# Patient Record
Sex: FEMALE | Race: WHITE
[De-identification: names, ages, dates, MRNs, and addresses within clinical notes are randomized per-mention and may not be internally consistent; named-entity substitution may affect disease eponyms.]

---

## 2017-01-12 ENCOUNTER — HOSPITAL ENCOUNTER (EMERGENCY)
Dept: HOSPITAL 25 - ED | Age: 42
Discharge: HOME | End: 2017-01-12
Payer: COMMERCIAL

## 2017-01-12 VITALS — DIASTOLIC BLOOD PRESSURE: 69 MMHG | SYSTOLIC BLOOD PRESSURE: 134 MMHG

## 2017-01-12 DIAGNOSIS — M79.642: Primary | ICD-10-CM

## 2017-01-12 PROCEDURE — 99282 EMERGENCY DEPT VISIT SF MDM: CPT

## 2017-01-12 NOTE — RAD
Indication: Left hand pain and injury.



4 views of left hand demonstrates no fracture. No other bone or joint abnormality is

noted.



IMPRESSION: No fracture of the left hand is noted.

## 2017-01-12 NOTE — ED
Upper Extremity Pain





- HPI Summary


HPI Summary: 


41 F presents with left hand pain s/p FOOSH.  She was going up the stairs in 

the dark when she tripped and fell on her left hand. She states the area felt 

numb right after but that has resolved.  She admit to pain over the palmar 

aspect of her hand.  She is right handed. 








- History of Current Complaint


Chief Complaint: EDExtremityUpper


Stated Complaint: POSSIBLE FRACTURE LT HAND


Time Seen by Provider: 01/12/17 20:14





- Allergies/Home Medications


Allergies/Adverse Reactions: 


 Allergies











Allergy/AdvReac Type Severity Reaction Status Date / Time


 


Penicillins Allergy Intermediate Hives Verified 01/28/15 09:37


 


Levothyroxine Allergy Mild Hives Verified 01/28/15 09:37





[From Synthroid]     


 


Soy Allergy Allergy Mild Rash Verified 01/28/15 09:37


 


Ciprofloxacin [From Cipro] Allergy  Hives Verified 07/11/15 21:16


 


DAIRY Allergy Mild Unknown Uncoded 01/28/15 09:37





   Reaction  





   Details  














PMH/Surg Hx/FS Hx/Imm Hx


Endocrine/Hematology History: 


   Denies: Hx Diabetes


Cardiovascular History: 


   Denies: Hx Angina


Infectious Disease History: No


Infectious Disease History: 


   Denies: Traveled Outside the [place] in Last 30 Days





- Family History


Known Family History: Positive: Hypertension





- Social History


Alcohol Use: None


Substance Use Type: Reports: None


Smoking Status (MU): Former Smoker





Review of Systems


Negative: Fever


Negative: Chest Pain


Negative: Shortness Of Breath


Positive: Myalgia - left hand pain


All Other Systems Reviewed And Are Negative: Yes





Physical Exam


Triage Information Reviewed: Yes


Vital Signs On Initial Exam: 


 Initial Vitals











Temp Pulse Resp BP Pulse Ox


 


 100 F   103   16   134/69   100 


 


 01/12/17 20:07  01/12/17 20:07  01/12/17 20:07  01/12/17 20:07  01/12/17 20:07











Vital Signs Reviewed: Yes


Appearance: Positive: Well-Appearing


Skin: Positive: Warm, Dry


Head/Face: Positive: Normal Head/Face Inspection


Eyes: Positive: Normal, Conjunctiva Clear


ENT: Positive: Normal ENT inspection, Pharynx normal, TMs normal


Respiratory/Lung Sounds: Positive: Clear to Auscultation, Breath Sounds Present


Cardiovascular: Positive: Normal, RRR


Musculoskeletal: Positive: Strength/ROM Intact - of left hand, Other - no snuff 

box tenderness, tender over palmar aspect of carpels, no step off noted, 

capillary refill <2secs, good pulses





Diagnostics





- Vital Signs


 Vital Signs











  Temp Pulse Resp BP Pulse Ox


 


 01/12/17 20:07  100 F  103  16  134/69  100














- Laboratory


Lab Statement: Any lab studies that have been ordered have been reviewed, and 

results considered in the medical decision making process.





- Radiology


  ** hand


Xray Interpretation: No Acute Changes


Radiology Interpretation Completed By: Radiologist





Course/Dx





- Course


Course Of Treatment: 41 F presents with left hand pain s/p FOOSH, admits to 

pain over palmar aspect of carpel bones, xray was normal, will treat 

conservatively patient agrees with plan





- Diagnoses


Differential Diagnosis/HQI/PQRI: Positive: Contusion, Fracture (Open), Strain, 

Sprain


Provider Diagnoses: 


 Left hand pain








Discharge





- Discharge Plan


Condition: Good


Disposition: HOME


Patient Education Materials:  Hand Sprain (ED)


Referrals: 


[facility] [place] [facility] [Outside]


Additional Instructions: 


Take Tylenol or ibuprofen every 6 hours as needed for pain


Apply ice, rest, elevate


Follow up with primary care physician within 5 days


Return to ED if develop numbness, tingling, inability to move joint, or any new 

or worsening symptoms

## 2017-05-07 ENCOUNTER — HOSPITAL ENCOUNTER (EMERGENCY)
Dept: HOSPITAL 25 - ED | Age: 42
Discharge: HOME | End: 2017-05-07
Payer: COMMERCIAL

## 2017-05-07 VITALS — DIASTOLIC BLOOD PRESSURE: 77 MMHG | SYSTOLIC BLOOD PRESSURE: 107 MMHG

## 2017-05-07 DIAGNOSIS — H91.8X1: ICD-10-CM

## 2017-05-07 DIAGNOSIS — R05: ICD-10-CM

## 2017-05-07 DIAGNOSIS — Z87.891: ICD-10-CM

## 2017-05-07 DIAGNOSIS — R53.1: ICD-10-CM

## 2017-05-07 DIAGNOSIS — R20.0: Primary | ICD-10-CM

## 2017-05-07 LAB
ALBUMIN SERPL BCG-MCNC: 4.8 G/DL (ref 3.2–5.2)
ALP SERPL-CCNC: 43 U/L (ref 34–104)
ALT SERPL W P-5'-P-CCNC: 14 U/L (ref 7–52)
ANION GAP SERPL CALC-SCNC: 6 MMOL/L (ref 2–11)
AST SERPL-CCNC: 14 U/L (ref 13–39)
BUN SERPL-MCNC: 9 MG/DL (ref 6–24)
BUN/CREAT SERPL: 11.8 (ref 8–20)
CALCIUM SERPL-MCNC: 10 MG/DL (ref 8.6–10.3)
CHLORIDE SERPL-SCNC: 101 MMOL/L (ref 101–111)
CK SERPL-CCNC: 76 U/L (ref 10–223)
GLOBULIN SER CALC-MCNC: 4.1 G/DL (ref 2–4)
GLUCOSE SERPL-MCNC: 96 MG/DL (ref 70–100)
HCO3 SERPL-SCNC: 26 MMOL/L (ref 22–32)
HCT VFR BLD AUTO: 44 % (ref 35–47)
HGB BLD-MCNC: 14.4 G/DL (ref 12–16)
LIPASE SERPL-CCNC: 24 U/L (ref 11–82)
MAGNESIUM SERPL-MCNC: 2.4 MG/DL (ref 1.9–2.7)
MCH RBC QN AUTO: 29 PG (ref 27–31)
MCHC RBC AUTO-ENTMCNC: 33 G/DL (ref 31–36)
MCV RBC AUTO: 89 FL (ref 80–97)
POTASSIUM SERPL-SCNC: 3.6 MMOL/L (ref 3.5–5)
PROT SERPL-MCNC: 8.9 G/DL (ref 6.4–8.9)
RBC # BLD AUTO: 4.92 10^6/UL (ref 4–5.4)
SODIUM SERPL-SCNC: 133 MMOL/L (ref 133–145)
TROPONIN I SERPL-MCNC: 0 NG/ML (ref ?–0.04)
TSH SERPL-ACNC: 5.09 MCIU/ML (ref 0.34–5.6)
WBC # BLD AUTO: 8.5 10^3/UL (ref 3.5–10.8)

## 2017-05-07 PROCEDURE — 85730 THROMBOPLASTIN TIME PARTIAL: CPT

## 2017-05-07 PROCEDURE — 86140 C-REACTIVE PROTEIN: CPT

## 2017-05-07 PROCEDURE — 84443 ASSAY THYROID STIM HORMONE: CPT

## 2017-05-07 PROCEDURE — 84702 CHORIONIC GONADOTROPIN TEST: CPT

## 2017-05-07 PROCEDURE — 80053 COMPREHEN METABOLIC PANEL: CPT

## 2017-05-07 PROCEDURE — 99282 EMERGENCY DEPT VISIT SF MDM: CPT

## 2017-05-07 PROCEDURE — 36415 COLL VENOUS BLD VENIPUNCTURE: CPT

## 2017-05-07 PROCEDURE — 82550 ASSAY OF CK (CPK): CPT

## 2017-05-07 PROCEDURE — 82553 CREATINE MB FRACTION: CPT

## 2017-05-07 PROCEDURE — 85379 FIBRIN DEGRADATION QUANT: CPT

## 2017-05-07 PROCEDURE — 93005 ELECTROCARDIOGRAM TRACING: CPT

## 2017-05-07 PROCEDURE — 82306 VITAMIN D 25 HYDROXY: CPT

## 2017-05-07 PROCEDURE — 85025 COMPLETE CBC W/AUTO DIFF WBC: CPT

## 2017-05-07 PROCEDURE — 84481 FREE ASSAY (FT-3): CPT

## 2017-05-07 PROCEDURE — 83605 ASSAY OF LACTIC ACID: CPT

## 2017-05-07 PROCEDURE — 85610 PROTHROMBIN TIME: CPT

## 2017-05-07 PROCEDURE — 80061 LIPID PANEL: CPT

## 2017-05-07 PROCEDURE — 83690 ASSAY OF LIPASE: CPT

## 2017-05-07 PROCEDURE — 84484 ASSAY OF TROPONIN QUANT: CPT

## 2017-05-07 PROCEDURE — 84439 ASSAY OF FREE THYROXINE: CPT

## 2017-05-07 PROCEDURE — 83735 ASSAY OF MAGNESIUM: CPT

## 2017-05-07 NOTE — ED
Jj CARROLL Billy, scribed for Jorge A aRjput MD on 05/07/17 at 1857 .





Complex/Multi-Sys Presentation





- HPI Summary


HPI Summary: 


Patient is a 42 year-old female with a history of Hashimoto's coming to [facility] 

for evaluation of multiple complaints. She states that she has had URI and 

muffled hearing out of her right ear for the last 3 weeks. However, she denies 

earache, and there has been no trauma or injury to the ear. She also has had 

paresthesia in her fingertips for the last week, which has happened before. 

Patient feels that her hands are getting weaker, worse on the right side. 

Furthermore, she has dysphagia when eating, feeling like "something gets stuck 

behind her throat." She also states that one month ago, she had an episode 

where her heart felt like "it was being squeezed." However, she denies any 

chest pain today.





- History Of Current Complaint


Chief Complaint: EDGeneral


Time Seen by Provider: 05/07/17 18:41


Hx Obtained From: Patient


Onset/Duration: Gradual Onset, Lasting Weeks


Timing: Constant


Severity Currently: Moderate


Severity Initially: Moderate


Aggravating Factor(s): none


Alleviating Factor(s): none


Associated Signs And Symptoms: Positive: Cough, Other - paresthesia, dysphagia, 

muffled hearing,.  Negative: Chest Pain





- Allergies/Home Medications


Allergies/Adverse Reactions: 


 Allergies











Allergy/AdvReac Type Severity Reaction Status Date / Time


 


Penicillins Allergy Intermediate Hives Verified 05/07/17 18:33


 


Levothyroxine Allergy Mild Hives Verified 05/07/17 18:33





[From Synthroid]     


 


Soy Allergy Allergy Mild Rash Verified 05/07/17 18:33


 


Ciprofloxacin [From Cipro] Allergy  Hives Verified 05/07/17 18:33


 


DAIRY Allergy Mild Unknown Uncoded 05/07/17 18:33





   Reaction  





   Details  














PMH/Surg Hx/FS Hx/Imm Hx


Endocrine/Hematology History: Reports: Other Endocrine/Hematological Disorders 

- Hashimoto's Disease


   Denies: Hx Diabetes


Cardiovascular History: 


   Denies: Hx Angina


Neurological History: Reports: Other Neuro Impairments/Disorders - Head injury


Infectious Disease History: No


Infectious Disease History: 


   Denies: Traveled Outside the [place] in Last 30 Days





- Family History


Known Family History: Positive: Cardiac Disease, Hypertension





- Social History


Alcohol Use: None


Substance Use Type: Reports: None


Smoking Status (MU): Former Smoker





Review of Systems


Negative: Fever, Chills


ENT: Other - muffled hearing from right ear


Negative: Ear Ache


Negative: Chest Pain


Positive: Cough


Gastrointestinal: Other - dysphagia


Negative: Edema


Negative: Rash


Positive: Weakness, Paresthesia


All Other Systems Reviewed And Are Negative: Yes





Physical Exam


Triage Information Reviewed: Yes


Vital Signs On Initial Exam: 


 Initial Vitals











Temp Pulse Resp BP Pulse Ox


 


 98.9 F   103   18   132/95   99 


 


 05/07/17 18:36  05/07/17 18:36  05/07/17 18:36  05/07/17 18:36  05/07/17 18:36











Vital Signs Reviewed: Yes


Appearance: Positive: Well-Appearing, No Pain Distress


Skin: Positive: Warm, Skin Color Reflects Adequate Perfusion, Dry


Head/Face: Positive: Normal Head/Face Inspection


Eyes: Positive: EOMI, LAKESHA


ENT: Positive: Normal ENT inspection, Pharynx normal, TMs normal


Neck: Positive: Supple, Nontender


Respiratory/Lung Sounds: Positive: Breath Sounds Present, Rhonchi


Cardiovascular: Positive: Pulses are Symmetrical in both Upper and Lower 

Extremities, Tachycardia


Abdomen Description: Positive: Nontender, Soft


Bowel Sounds: Positive: Present


Musculoskeletal: Positive: Normal, Strength/ROM Intact - 5/5 strength 

bilaterally, Other - Calves are soft and nontender..  Negative: Edema Left, 

Edema Right


Neurological: Positive: Normal, Sensory/Motor Intact, Alert, Oriented to Person 

Place, Time


Psychiatric: Positive: Anxious





Diagnostics





- Vital Signs


 Vital Signs











  Temp Pulse Resp BP Pulse Ox


 


 05/07/17 18:36  98.9 F  103  18  132/95  99














- Laboratory


Lab Results: 


 Lab Results











  05/07/17 05/07/17 05/07/17 Range/Units





  19:31 19:31 19:31 


 


WBC  8.5    (3.5-10.8)  10^3/ul


 


RBC  4.92    (4.0-5.4)  10^6/ul


 


Hgb  14.4    (12.0-16.0)  g/dl


 


Hct  44    (35-47)  %


 


MCV  89    (80-97)  fL


 


MCH  29    (27-31)  pg


 


MCHC  33    (31-36)  g/dl


 


RDW  14    (10.5-15)  %


 


Plt Count  259    (150-450)  10^3/ul


 


MPV  10    (7.4-10.4)  um3


 


Neut % (Auto)  65.4    (38-83)  %


 


Lymph % (Auto)  25.6    (25-47)  %


 


Mono % (Auto)  6.1    (1-9)  %


 


Eos % (Auto)  2.2    (0-6)  %


 


Baso % (Auto)  0.7    (0-2)  %


 


Absolute Neuts (auto)  5.6    (1.5-7.7)  10^3/ul


 


Absolute Lymphs (auto)  2.2    (1.0-4.8)  10^3/ul


 


Absolute Monos (auto)  0.5    (0-0.8)  10^3/ul


 


Absolute Eos (auto)  0.2    (0-0.6)  10^3/ul


 


Absolute Basos (auto)  0.1    (0-0.2)  10^3/ul


 


Absolute Nucleated RBC  0    10^3/ul


 


Nucleated RBC %  0    


 


INR (Anticoag Therapy)   0.93   (0.89-1.11)  


 


APTT   35.0   (26.0-36.3)  seconds


 


D-Dimer, Quantitative   < 200   (Less Than 230)  ng/mL


 


Sodium    133  (133-145)  mmol/L


 


Potassium    3.6  (3.5-5.0)  mmol/L


 


Chloride    101  (101-111)  mmol/L


 


Carbon Dioxide    26  (22-32)  mmol/L


 


Anion Gap    6  (2-11)  mmol/L


 


BUN    9  (6-24)  mg/dL


 


Creatinine    0.76  (0.51-0.95)  mg/dL


 


Est GFR ( Amer)    106.3  (>60)  


 


Est GFR (Non-Af Amer)    82.7  (>60)  


 


BUN/Creatinine Ratio    11.8  (8-20)  


 


Glucose    96  ()  mg/dL


 


Lactic Acid     (0.5-2.0)  mmol/L


 


Calcium    10.0  (8.6-10.3)  mg/dL


 


Magnesium    2.4  (1.9-2.7)  mg/dL


 


Total Bilirubin    0.50  (0.2-1.0)  mg/dL


 


AST    14  (13-39)  U/L


 


ALT    14  (7-52)  U/L


 


Alkaline Phosphatase    43  ()  U/L


 


Total Creatine Kinase    76  ()  U/L


 


CK-MB (CK-2)    2.1  (0.6-6.3)  ng/mL


 


Troponin I    0.00  (<0.04)  ng/mL


 


C-Reactive Protein    < 1.00  (< 5.00)  mg/L


 


Total Protein    8.9  (6.4-8.9)  g/dL


 


Albumin    4.8  (3.2-5.2)  g/dL


 


Globulin    4.1 H  (2-4)  g/dL


 


Albumin/Globulin Ratio    1.2  (1-3)  


 


Lipase    24  (11.0-82.0)  U/L


 


TSH    5.09  (0.34-5.60)  mcIU/mL


 


Beta HCG, Quant    < 0.60  mIU/mL














  05/07/17 Range/Units





  19:31 


 


WBC   (3.5-10.8)  10^3/ul


 


RBC   (4.0-5.4)  10^6/ul


 


Hgb   (12.0-16.0)  g/dl


 


Hct   (35-47)  %


 


MCV   (80-97)  fL


 


MCH   (27-31)  pg


 


MCHC   (31-36)  g/dl


 


RDW   (10.5-15)  %


 


Plt Count   (150-450)  10^3/ul


 


MPV   (7.4-10.4)  um3


 


Neut % (Auto)   (38-83)  %


 


Lymph % (Auto)   (25-47)  %


 


Mono % (Auto)   (1-9)  %


 


Eos % (Auto)   (0-6)  %


 


Baso % (Auto)   (0-2)  %


 


Absolute Neuts (auto)   (1.5-7.7)  10^3/ul


 


Absolute Lymphs (auto)   (1.0-4.8)  10^3/ul


 


Absolute Monos (auto)   (0-0.8)  10^3/ul


 


Absolute Eos (auto)   (0-0.6)  10^3/ul


 


Absolute Basos (auto)   (0-0.2)  10^3/ul


 


Absolute Nucleated RBC   10^3/ul


 


Nucleated RBC %   


 


INR (Anticoag Therapy)   (0.89-1.11)  


 


APTT   (26.0-36.3)  seconds


 


D-Dimer, Quantitative   (Less Than 230)  ng/mL


 


Sodium   (133-145)  mmol/L


 


Potassium   (3.5-5.0)  mmol/L


 


Chloride   (101-111)  mmol/L


 


Carbon Dioxide   (22-32)  mmol/L


 


Anion Gap   (2-11)  mmol/L


 


BUN   (6-24)  mg/dL


 


Creatinine   (0.51-0.95)  mg/dL


 


Est GFR ( Amer)   (>60)  


 


Est GFR (Non-Af Amer)   (>60)  


 


BUN/Creatinine Ratio   (8-20)  


 


Glucose   ()  mg/dL


 


Lactic Acid  0.9  (0.5-2.0)  mmol/L


 


Calcium   (8.6-10.3)  mg/dL


 


Magnesium   (1.9-2.7)  mg/dL


 


Total Bilirubin   (0.2-1.0)  mg/dL


 


AST   (13-39)  U/L


 


ALT   (7-52)  U/L


 


Alkaline Phosphatase   ()  U/L


 


Total Creatine Kinase   ()  U/L


 


CK-MB (CK-2)   (0.6-6.3)  ng/mL


 


Troponin I   (<0.04)  ng/mL


 


C-Reactive Protein   (< 5.00)  mg/L


 


Total Protein   (6.4-8.9)  g/dL


 


Albumin   (3.2-5.2)  g/dL


 


Globulin   (2-4)  g/dL


 


Albumin/Globulin Ratio   (1-3)  


 


Lipase   (11.0-82.0)  U/L


 


TSH   (0.34-5.60)  mcIU/mL


 


Beta HCG, Quant   mIU/mL











Result Diagrams: 


 05/07/17 19:31





 05/07/17 19:31


Lab Statement: Any lab studies that have been ordered have been reviewed, and 

results considered in the medical decision making process.





- EKG


  ** 2009


Cardiac Rate: NL - 82 bpm


EKG Rhythm: Sinus Rhythm


ST Segment: Normal


Ectopy: None





Complex Multi-Symp Course/Dx


Course Of Treatment: NO CRITICAL CARE TIME


Assessment/Plan: DISCUSSED LAB RESULTS WITH PATIENT. SHE WILL F/U WITH HER PMD. 

DISCHARGE HOME STABLE.





- Diagnoses


Provider Diagnoses: 


 Paresthesia of hand, bilateral, Hearing loss associated with syndrome of right 

ear








Discharge





- Discharge Plan


Condition: Stable


Disposition: HOME


Patient Education Materials:  Paresthesia (ED), Hearing Loss (ED)


Referrals: 


Cherelle Shirley MD [Primary Care Provider] - 


Additional Instructions: 


FOLLOW UP WITH YOUR DOCTOR.


RETURN TO THE EMERGENCY DEPARTMENT FOR ANY WORSENING OF YOUR CONDITION OR 

QUESTIONS OR CONCERNS.





The documentation as recorded by the Jj goodson Billy accurately reflects the 

service I personally performed and the decisions made by me, Jorge A Rajput MD.

## 2017-05-08 LAB
CHOLEST SERPL-MCNC: 223 MG/DL
HDLC SERPL-MCNC: 64.9 MG/DL
T3FREE SERPL-MCNC: 3.7 PG/ML (ref 2.5–3.9)
TRIGL SERPL-MCNC: 59 MG/DL

## 2017-08-11 ENCOUNTER — HOSPITAL ENCOUNTER (EMERGENCY)
Dept: HOSPITAL 25 - UCEAST | Age: 42
Discharge: HOME | End: 2017-08-11
Payer: COMMERCIAL

## 2017-08-11 VITALS — SYSTOLIC BLOOD PRESSURE: 100 MMHG | DIASTOLIC BLOOD PRESSURE: 64 MMHG

## 2017-08-11 DIAGNOSIS — Z88.1: ICD-10-CM

## 2017-08-11 DIAGNOSIS — R35.0: ICD-10-CM

## 2017-08-11 DIAGNOSIS — M54.5: ICD-10-CM

## 2017-08-11 DIAGNOSIS — Z87.891: ICD-10-CM

## 2017-08-11 DIAGNOSIS — Z88.8: ICD-10-CM

## 2017-08-11 DIAGNOSIS — Z88.0: ICD-10-CM

## 2017-08-11 DIAGNOSIS — Z32.02: ICD-10-CM

## 2017-08-11 DIAGNOSIS — R10.9: Primary | ICD-10-CM

## 2017-08-11 PROCEDURE — 76775 US EXAM ABDO BACK WALL LIM: CPT

## 2017-08-11 PROCEDURE — 99211 OFF/OP EST MAY X REQ PHY/QHP: CPT

## 2017-08-11 PROCEDURE — 84702 CHORIONIC GONADOTROPIN TEST: CPT

## 2017-08-11 PROCEDURE — G0463 HOSPITAL OUTPT CLINIC VISIT: HCPCS

## 2017-08-11 PROCEDURE — 81003 URINALYSIS AUTO W/O SCOPE: CPT

## 2017-08-11 NOTE — UC
Complaint Female HPI





- HPI Summary


HPI Summary: 





right side lower back pain also increase freuqency voiding--no fever chills 

night sweats, nausea, vomiting or diarrhea. advised by her pcp to seek care as 

this may be a uti





- History Of Current Complaint


Chief Complaint: UCBackPain


Stated Complaint: LOWER BACK PAIN


Time Seen by Provider: 08/11/17 11:39


Hx Obtained From: Patient


Hx Last Menstrual Period: 8/10/17


Pregnant?: No


Onset/Duration: Gradual Onset, Lasting Days


Timing: Constant


Severity Initially: Mild


Severity Currently: Mild


Aggravating Factor(s): Nothing


Alleviating Factor(s): Position


Associated Signs And Symptoms: Positive: Back Pain - right side





- Allergies/Home Medications


Allergies/Adverse Reactions: 


 Allergies











Allergy/AdvReac Type Severity Reaction Status Date / Time


 


Penicillins Allergy Intermediate Hives Verified 08/11/17 11:16


 


Levothyroxine Allergy Mild Hives Verified 08/11/17 11:16





[From Synthroid]     


 


Soy Allergy Allergy Mild Rash Verified 08/11/17 11:16


 


Ciprofloxacin [From Cipro] Allergy  Hives Verified 08/11/17 11:16


 


DAIRY Allergy Mild Unknown Uncoded 08/11/17 11:16





   Reaction  





   Details  














PMH/Surg Hx/FS Hx/Imm Hx


Previously Healthy: Yes





- Surgical History


Surgical History: None





- Family History


Known Family History: Positive: Cardiac Disease, Hypertension





- Social History


Occupation: Unemployed


Lives: With Family


Alcohol Use: None


Substance Use Type: None


Smoking Status (MU): Former Smoker





Review of Systems


Constitutional: Negative


Skin: Negative


Eyes: Negative


ENT: Negative


Respiratory: Negative


Cardiovascular: Negative


Gastrointestinal: Negative


Genitourinary: Frequency


Motor: Negative


Neurovascular: Negative


Musculoskeletal: Arthralgia - right side of back


Neurological: Negative


Psychological: Negative


All Other Systems Reviewed And Are Negative: Yes





Physical Exam


Triage Information Reviewed: Yes


Appearance: Well-Appearing, No Pain Distress, Well-Nourished


Vital Signs: 


 Initial Vital Signs











Temp  98.7 F   08/11/17 11:17


 


Pulse  69   08/11/17 11:17


 


Resp  16   08/11/17 11:17


 


BP  99/63   08/11/17 11:17


 


Pulse Ox  100   08/11/17 11:17











Vital Signs Reviewed: Yes


Eye Exam: Normal


Eyes: Positive: Conjunctiva Clear


ENT Exam: Normal


ENT: Positive: Normal ENT inspection, Hearing grossly normal.  Negative: Nasal 

congestion, Nasal drainage, Muffled/hoarse voice


Dental Exam: Normal


Neck exam: Normal


Neck: Positive: Supple, Nontender


Respiratory Exam: Normal


Respiratory: Positive: Chest non-tender, Lungs clear, Normal breath sounds, No 

respiratory distress, No accessory muscle use


Cardiovascular Exam: Normal


Cardiovascular: Positive: RRR, No Murmur, Pulses Normal, Brisk Capillary Refill


Abdominal Exam: Normal


Abdomen Description: Positive: Nontender, No Organomegaly, Soft.  Negative: CVA 

Tenderness (R), CVA Tenderness (L), McBurney's Point Tenderness, Peritoneal 

Signs


Bowel Sounds: Positive: Present


Musculoskeletal Exam: Normal


Musculoskeletal: Positive: Strength Intact, ROM Intact, No Edema


Neurological Exam: Normal


Neurological: Positive: Alert, Muscle Tone Normal


Psychological Exam: Normal


Skin Exam: Normal





Diagnostics





- Laboratory


Diagnostic Studies Completed/Ordered: ua-trace lysed red cell, us of kidney 

negative





 Complaint Female Dx





- Course


Course Of Treatment: rest, increase fluids, follow with Dr. Black





- Differential Dx/Diagnosis


Differential Diagnosis/HQI/PQRI: Renal Colic, Ureteral Stone, Urinary Tract 

Infection


Provider Diagnoses: Abd pain





Discharge





- Discharge Plan


Condition: Stable


Disposition: HOME


Patient Education Materials:  Pelvic Pain in Women (ED)


Referrals: 


Cherelle Shirley MD [Primary Care Provider] - 3 Days

## 2017-08-11 NOTE — RAD
Indication: Right flank pain.



Real-time sonography of the right kidney was performed.



The right kidney measures 11.6 x 4.0 x 5.2 cm. No hydronephrosis is noted.



IMPRESSION: No hydronephrosis of the right kidney is noted.

## 2019-07-28 ENCOUNTER — HOSPITAL ENCOUNTER (EMERGENCY)
Dept: HOSPITAL 25 - ED | Age: 44
Discharge: HOME | End: 2019-07-28
Payer: COMMERCIAL

## 2019-07-28 VITALS — DIASTOLIC BLOOD PRESSURE: 55 MMHG | SYSTOLIC BLOOD PRESSURE: 93 MMHG
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
## 2019-07-28 DIAGNOSIS — Z87.891: ICD-10-CM

## 2019-07-28 DIAGNOSIS — Z91.018: ICD-10-CM

## 2019-07-28 DIAGNOSIS — Z88.1: ICD-10-CM

## 2019-07-28 DIAGNOSIS — Z91.011: ICD-10-CM

## 2019-07-28 DIAGNOSIS — Z88.0: ICD-10-CM

## 2019-07-28 DIAGNOSIS — E06.3: ICD-10-CM

## 2019-07-28 DIAGNOSIS — S91.115A: Primary | ICD-10-CM
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
## 2019-07-28 PROCEDURE — 99282 EMERGENCY DEPT VISIT SF MDM: CPT

## 2019-07-28 NOTE — ED
Lower Extremity





- HPI Summary


HPI Summary: 





Patient complains of pain and bleeding to left fifth digit after banging it on 

a barstool leg today.  Denies any other pain, injury or symptoms.  Patient 

ambulatory.





- History of Current Complaint


Chief Complaint: EDExtremityLower


Stated Complaint: BROKEN TOE PER PT


Time Seen by Provider: 07/28/19 19:49


Hx Obtained From: Patient


Hx Last Menstrual Period: 8/10/17


Mechanism Of Injury: Blunt Trauma


Onset of Pain: Immediate


Onset/Duration: Hours


Severity Initially: Moderate


Severity Currently: Moderate


Pain Intensity: 5


Pain Scale Used: 0-10 Numeric


Timing: Constant


Location: Is Discrete @


Character Of Pain: Throbbing


Associated Signs And Symptoms: Positive: Negative


Aggravating Factor(s): Ambulation, Weight Bearing


Alleviating Factor(s): Rest





- Allergies/Home Medications


Allergies/Adverse Reactions: 


 Allergies











Allergy/AdvReac Type Severity Reaction Status Date / Time


 


ciprofloxacin [From Cipro] Allergy  Hives Verified 07/28/19 19:58


 


levothyroxine Allergy  Hives Verified 07/28/19 19:58


 


Milk Containing Products Allergy  Unknown Verified 07/28/19 19:58





   Reaction  





   Details  


 


Penicillins Allergy  Hives Verified 07/28/19 19:58


 


soy Allergy  Rash Verified 07/28/19 19:58











Home Medications: 


 Home Medications





Thyroid TAB* [Thyroid TAB 30 MG*] 30 mg PO BID 07/28/19 [History Confirmed 07/28 /19]











PMH/Surg Hx/FS Hx/Imm Hx


Endocrine/Hematology History: Reports: Other Endocrine/Hematological Disorders 

- Hashimoto's Disease


   Denies: Hx Diabetes


Cardiovascular History: 


   Denies: Hx Angina


 History: 


   Denies: Hx Dialysis


Sensory History: 


   Denies: Hx Eye Prosthesis


Opthamlomology History: 


   Denies: Hx Legally Blind


EENT History: 


   Denies: Hx Deafness


Neurological History: Reports: Other Neuro Impairments/Disorders - Head injury


Infectious Disease History: No


Infectious Disease History: 


   Denies: History Other Infectious Disease, Traveled Outside the [place] in Last 30 

Days





- Family History


Known Family History: Positive: Cardiac Disease, Hypertension





- Social History


Alcohol Use: None


Substance Use Type: Reports: None


Smoking Status (MU): Former Smoker





Review of Systems


Constitutional: Negative


Eyes: Negative


ENT: Negative


Cardiovascular: Negative


Respiratory: Negative


Gastrointestinal: Negative


Genitourinary: Negative


Musculoskeletal: Other


Skin: Negative


Neurological: Negative


Psychological: Normal


All Other Systems Reviewed And Are Negative: Yes





Physical Exam





- Summary


Physical Exam Summary: 





No erythema, ecchymosis, deformity, swelling noted to left foot or left fifth 

digit.  Evidence of prior bleeding between fourth and fifth digits with no 

active bleeding.  Small superficial laceration to webbing between fourth and 

fifth digits.  PMS intact.


Triage Information Reviewed: Yes


Vital Signs On Initial Exam: 


 Initial Vitals











Temp Pulse Resp BP Pulse Ox


 


 98.6 F   110   18   133/92   97 


 


 07/28/19 19:10  07/28/19 19:10  07/28/19 19:10  07/28/19 19:10  07/28/19 19:10











Vital Signs Reviewed: Yes


Appearance: Positive: Well-Appearing


Skin: Positive: Warm


Head/Face: Positive: Normal Head/Face Inspection


Eyes: Positive: Normal


Neck: Positive: Supple


Respiratory/Lung Sounds: Positive: Clear to Auscultation


Cardiovascular: Positive: Normal


Abdomen Description: Positive: Nontender


Musculoskeletal: Positive: Normal


Neurological: Positive: Normal


Psychiatric: Positive: Normal


AVPU Assessment: Alert





- Star Coma Scale


Best Eye Response: 4 - Spontaneous


Best Motor Response: 6 - Obeys Commands


Best Verbal Response: 5 - Oriented


Coma Scale Total: 15





Diagnostics





- Vital Signs


 Vital Signs











  Temp Pulse Resp BP Pulse Ox


 


 07/28/19 19:10  98.6 F  110  18  133/92  97














- Laboratory


Lab Statement: Any lab studies that have been ordered have been reviewed, and 

results considered in the medical decision making process.





Lower Extremity Course/Dx





- Course


Course Of Treatment: Patient complains of pain and bleeding to left fifth digit 

after banging it on a barstool leg today.  Denies any other pain, injury or 

symptoms.  Patient ambulatory.  Vital signs within normal limits.  X-ray 

negative for fracture.  Patient refused suturing.  Wound cleaned with Hibiclens

, covered with antibiotic ointment and fifth toe taped to 4th toe to facilitate 

healing of laceration.  Patient advised to clean wound daily with warm running 

water and soap, apply antibiotic ointment to wound, and tape fifth toe to the 

fourth toe to facilitate healing of laceration. Patient has appointment with 

podiatrist on August 5 and will follow-up there.





- Diagnoses


Provider Diagnoses: 


 Laceration








Discharge





- Sign-Out/Discharge


Documenting (check all that apply): Patient Departure


Patient Received Moderate/Deep Sedation with Procedure: No





- Discharge Plan


Condition: Stable


Disposition: HOME


Patient Education Materials:  Laceration (ED)


Referrals: 


Cherelle Shirley MD [Primary Care Provider] - 


Additional Instructions: 


Wash wound daily with warm running water and soap.  Cover daily with antibiotic 

ointment and tape fifth toe to fourth toe until healed.  Tylenol or ibuprofen 

for pain.  Follow-up at your already scheduled appointment with your podiatrist 

on 8/5/19.  Return to the ED for any new or worsening symptoms.





- Billing Disposition and Condition


Condition: STABLE


Disposition: Home

## 2019-12-24 ENCOUNTER — HOSPITAL ENCOUNTER (EMERGENCY)
Dept: HOSPITAL 25 - ED | Age: 44
Discharge: HOME | End: 2019-12-24
Payer: COMMERCIAL

## 2019-12-24 VITALS — DIASTOLIC BLOOD PRESSURE: 91 MMHG | SYSTOLIC BLOOD PRESSURE: 130 MMHG

## 2019-12-24 DIAGNOSIS — M25.561: Primary | ICD-10-CM

## 2019-12-24 DIAGNOSIS — Z91.011: ICD-10-CM

## 2019-12-24 DIAGNOSIS — Z87.891: ICD-10-CM

## 2019-12-24 DIAGNOSIS — Z91.018: ICD-10-CM

## 2019-12-24 DIAGNOSIS — Z88.0: ICD-10-CM

## 2019-12-24 DIAGNOSIS — Z88.1: ICD-10-CM

## 2019-12-24 PROCEDURE — 99281 EMR DPT VST MAYX REQ PHY/QHP: CPT

## 2019-12-24 NOTE — ED
Lower Extremity





- HPI Summary


HPI Summary: 





This pt is a 45 [facility] 

2 weeks. Pt reports 2 weeks ago she was putting her son to bed when she was put 

her right knee on the side of the bed. She notes her kneecap slid all the way 

over and it slid back on its own. She states it never slid back to the "right 

spot." Pt states it feels like her kneecap is moving and is unstable. Her knee 

pain is aggravated with ambulation and weight bearing. Pt notes she is unable 

to ambulate secondary to pain. She has been using her knee brace with mild 

relief. Pt has not taken any pain medications at home. She called her PCP and 

was referred to an orthopedist. Pt booked an appointment for January 2nd, 2020 

but came in today due to consistent pain.


PMHx: Hashimoto's, pulmonary HTN, sleep apnea. 


Denies tobacco and drug use but admits to occasional alcohol use.


Pt reports she is very very sensitive to medications, including ibuprofen and 

tylenol. Allergic to Penicillin, Azithromycin.





Medications reviewed. Allergies noted. 





- History of Current Complaint


Chief Complaint: EDExtremityLower


Stated Complaint: KNEE PAIN PER PT


Time Seen by Provider: 12/24/19 12:43


Hx Obtained From: Patient


Hx Last Menstrual Period: 8/10/17


Mechanism Of Injury: Other - no trauma


Onset of Pain: Days


Onset/Duration: Still Present


Severity Currently: Moderate


Pain Intensity: 4


Pain Scale Used: 0-10 Numeric


Timing: Lasting Weeks


Location: Is Discrete @ - right knee


Associated Signs And Symptoms: Positive: Negative


Aggravating Factor(s): Ambulation, Movement


Alleviating Factor(s): Rest


Able to Bear Weight: No - secondary to pain





- Allergies/Home Medications


Allergies/Adverse Reactions: 


 Allergies











Allergy/AdvReac Type Severity Reaction Status Date / Time


 


azithromycin Allergy  Hives Verified 12/24/19 12:21


 


ciprofloxacin [From Cipro] Allergy  Hives Verified 12/24/19 12:20


 


levothyroxine Allergy  Hives Verified 12/24/19 12:20


 


Milk Containing Products Allergy  Unknown Verified 12/24/19 12:20





   Reaction  





   Details  


 


Penicillins Allergy  Hives Verified 12/24/19 12:20


 


soy Allergy  Rash Verified 12/24/19 12:20














PMH/Surg Hx/FS Hx/Imm Hx


Endocrine/Hematology History: Reports: Other Endocrine/Hematological Disorders 

- Hashimoto's Disease


   Denies: Hx Diabetes


Cardiovascular History: 


   Denies: Hx Angina


Respiratory History: Reports: Hx Sleep Apnea


 History: 


   Denies: Hx Dialysis


Sensory History: 


   Denies: Hx Eye Prosthesis, Hx Legally Blind, Hx Deafness


Opthamlomology History: 


   Denies: Hx Eye Prosthesis, Hx Legally Blind


Neurological History: Reports: Other Neuro Impairments/Disorders - Head injury


Infectious Disease History: No


Infectious Disease History: 


   Denies: History Other Infectious Disease, Traveled Outside the [place] in Last 30 

Days





- Family History


Known Family History: Positive: Cardiac Disease, Hypertension





- Social History


Alcohol Use: None


Substance Use Type: Reports: None


Smoking Status (MU): Former Smoker





Review of Systems


Negative: Fever


Cardiovascular: Negative


Respiratory: Negative


Gastrointestinal: Negative


Musculoskeletal: Other - POSITIVE: right knee pain


Neurological: Negative


All Other Systems Reviewed And Are Negative: Yes





Physical Exam





- Summary


Physical Exam Summary: 





Constitutional: Well-developed, Well-nourished, Alert. (-) Distressed


Skin: Warm, Dry


HENT: Normocephalic; Atraumatic


Eyes: Conjunctiva normal


Neck: Musculoskeletal ROM normal neck. (-) JVD, (-) Stridor, (-) Tracheal 

deviation


Cardio: Rhythm regular, rate normal, Heart sounds normal; Intact distal pulses; 

The pedal pulses are 2+ and symmetric. Radial pulses are 2+ and symmetric. (-) 

Murmur


Pulmonary/Chest wall: Effort normal. (-) Respiratory distress, (-) Wheezes, (-) 

Rales


Abd: Soft, (-) tenderness, (-) Distension, (-) Guarding, (-) Rebound


Musculoskeletal: Tender to medial proximal right tibia. Full ROM of right knee. 

DP/PT pulses 2+. Pain when patient dorsiflexes her foot in that area.


Lymph: (-) Cervical adenopathy


Neuro: Alert, Oriented x3


Psych: Mood and affect Normal


Triage Information Reviewed: Yes


Vital Signs On Initial Exam: 


 Initial Vitals











Temp Pulse Resp BP Pulse Ox


 


 99.1 F   76   16   148/76   99 


 


 12/24/19 12:17  12/24/19 12:17  12/24/19 12:17  12/24/19 12:17  12/24/19 12:17











Vital Signs Reviewed: Yes





Procedures





- Sedation


Patient Received Moderate/Deep Sedation with Procedure: No





Diagnostics





- Vital Signs


 Vital Signs











  Temp Pulse Resp BP Pulse Ox


 


 12/24/19 12:17  99.1 F  76  16  148/76  99














- Laboratory


Lab Statement: Any lab studies that have been ordered have been reviewed, and 

results considered in the medical decision making process.





- Radiology


  ** Right knee XR


Radiology Interpretation Completed By: Radiologist


Summary of Radiographic Findings: IMPRESSION: No evidence for fracture. Dr. Arizmendi has reviewed this report.





Lower Extremity Course/Dx





- Course


Course Of Treatment: Patient is here with pain in her knee following a minor 

injury.  Patient states she felt her patella become displaced and spontaneously 

go back in place.  This occurred a couple weeks ago.  Patient's had worsening 

pain today so she wanted to be evaluated.  Patient had negative x-ray for any 

fracture or subluxation of her patella.  Patient is likely suffering from an 

internal knee injury.  Patient was placed in a knee immobilizer and will follow 

up on her already scheduled orthopedic appointment.





- Diagnoses


Provider Diagnoses: 


 Right knee pain








Discharge ED





- Sign-Out/Discharge


Documenting (check all that apply): Patient Departure - Discharge home





- Discharge Plan


Condition: Stable


Disposition: HOME


Patient Education Materials:  Knee Pain (ED)


Referrals: 


Cherelle Shirley MD [Primary Care Provider] - 


Khloe Chamorro MD [Medical Doctor] - 


Additional Instructions: 


Wear your brace for comfort as needed.


You can apply weight to your leg but stop if it hurts.


Ice your knee for pain control if you don't want to take Ibuprofen or Tylenol.


Follow up with orthopedics next week as scheduled. 





PLEASE RETURN TO EMERGENCY DEPARTMENT FOR ANY NEW OR WORSENING SYMPTOMS. 





- Billing Disposition and Condition


Condition: STABLE


Disposition: Home





- Attestation Statements


Document Initiated by Sagar: Yes


Documenting Scribe: Karin Smith


Provider For Whom Sagar is Documenting (Include Credential): Socrates Arizmendi MD


Scribe Attestation: 


Karin CARROLL, vicenteibed for Socrates Airzmendi MD on 12/24/19 at 1814. 


Scribe Documentation Reviewed: Yes


Provider Attestation: 


The documentation as recorded by the Karin goodson accurately reflects 

the service I personally performed and the decisions made by me, Socrates Arizmendi MD


Status of Scribe Document: Viewed

## 2020-02-09 ENCOUNTER — HOSPITAL ENCOUNTER (EMERGENCY)
Dept: HOSPITAL 25 - ED | Age: 45
Discharge: HOME | End: 2020-02-09
Payer: COMMERCIAL

## 2020-02-09 VITALS — SYSTOLIC BLOOD PRESSURE: 140 MMHG | DIASTOLIC BLOOD PRESSURE: 88 MMHG

## 2020-02-09 DIAGNOSIS — R35.0: ICD-10-CM

## 2020-02-09 DIAGNOSIS — Z88.1: ICD-10-CM

## 2020-02-09 DIAGNOSIS — Z88.0: ICD-10-CM

## 2020-02-09 DIAGNOSIS — M54.5: Primary | ICD-10-CM

## 2020-02-09 DIAGNOSIS — Z87.891: ICD-10-CM

## 2020-02-09 DIAGNOSIS — Z91.018: ICD-10-CM

## 2020-02-09 DIAGNOSIS — R50.9: ICD-10-CM

## 2020-02-09 DIAGNOSIS — Z91.011: ICD-10-CM

## 2020-02-09 LAB
ALBUMIN SERPL BCG-MCNC: 4.5 G/DL (ref 3.2–5.2)
ALBUMIN/GLOB SERPL: 1.4 {RATIO} (ref 1–3)
ALP SERPL-CCNC: 38 U/L (ref 34–104)
ALT SERPL W P-5'-P-CCNC: 10 U/L (ref 7–52)
ANION GAP SERPL CALC-SCNC: 5 MMOL/L (ref 2–11)
AST SERPL-CCNC: 12 U/L (ref 13–39)
BASOPHILS # BLD AUTO: 0 10^3/UL (ref 0–0.2)
BUN SERPL-MCNC: 9 MG/DL (ref 6–24)
BUN/CREAT SERPL: 12.5 (ref 8–20)
CALCIUM SERPL-MCNC: 9.3 MG/DL (ref 8.6–10.3)
CHLORIDE SERPL-SCNC: 105 MMOL/L (ref 101–111)
EOSINOPHIL # BLD AUTO: 0.2 10^3/UL (ref 0–0.6)
GLOBULIN SER CALC-MCNC: 3.3 G/DL (ref 2–4)
GLUCOSE SERPL-MCNC: 87 MG/DL (ref 70–100)
HCO3 SERPL-SCNC: 24 MMOL/L (ref 22–32)
HCT VFR BLD AUTO: 40 % (ref 35–47)
HGB BLD-MCNC: 13.4 G/DL (ref 12–16)
LYMPHOCYTES # BLD AUTO: 1.7 10^3/UL (ref 1–4.8)
MCH RBC QN AUTO: 30 PG (ref 27–31)
MCHC RBC AUTO-ENTMCNC: 33 G/DL (ref 31–36)
MCV RBC AUTO: 89 FL (ref 80–97)
MONOCYTES # BLD AUTO: 0.4 10^3/UL (ref 0–0.8)
NEUTROPHILS # BLD AUTO: 3.5 10^3/UL (ref 1.5–7.7)
NRBC # BLD AUTO: 0 10^3/UL
NRBC BLD QL AUTO: 0.1
PLATELET # BLD AUTO: 249 10^3/UL (ref 150–450)
POTASSIUM SERPL-SCNC: 4.7 MMOL/L (ref 3.5–5)
PROT SERPL-MCNC: 7.8 G/DL (ref 6.4–8.9)
RBC # BLD AUTO: 4.51 10^6 /UL (ref 3.7–4.87)
RBC UR QL AUTO: (no result)
SODIUM SERPL-SCNC: 134 MMOL/L (ref 135–145)
WBC # BLD AUTO: 5.9 10^3/UL (ref 3.5–10.8)

## 2020-02-09 PROCEDURE — 74176 CT ABD & PELVIS W/O CONTRAST: CPT

## 2020-02-09 PROCEDURE — 36415 COLL VENOUS BLD VENIPUNCTURE: CPT

## 2020-02-09 PROCEDURE — 81003 URINALYSIS AUTO W/O SCOPE: CPT

## 2020-02-09 PROCEDURE — 81015 MICROSCOPIC EXAM OF URINE: CPT

## 2020-02-09 PROCEDURE — 80053 COMPREHEN METABOLIC PANEL: CPT

## 2020-02-09 PROCEDURE — 96360 HYDRATION IV INFUSION INIT: CPT

## 2020-02-09 PROCEDURE — 99282 EMERGENCY DEPT VISIT SF MDM: CPT

## 2020-02-09 PROCEDURE — 85025 COMPLETE CBC W/AUTO DIFF WBC: CPT

## 2020-02-09 NOTE — ED
GI/ HPI





- HPI Summary


HPI Summary: 


The patient is a 45-year-old female presenting to [facility] emergency department with 

a chief complaint of bilateral flank pain and increased urinary frequency 

worsening since 2/5/2020. She reports that she was experiencing low back 

discomfort, so she went to her PCPs office and was diagnosed with a urinary 

tract infection and placed on Bactrim. She was not suffering from any urinary 

symptoms at that time, and she has no history of UTIs in the past. She is now 

experiencing intermittent fevers and increased urinary frequency, and the back 

pain has begun to move up the back and flanks bilaterally. She denies any 

dysuria or burning with urination. Symptoms are currently rated 5/10 in 

severity. Tehre are no aggravating or alleviating factors. She is unsure of her 

last normal menstrual period. Past medical history significant for Hashimotos 

Disease, chronic Norma-Barr virus, previous head trauma. Former smoker, no 

alcohol use, rare substance use. Medications reviewed. Allergies noted.





- History of Current Complaint


Chief Complaint: EDFlankPain


Time Seen by Provider: 02/09/20 14:50


Stated Complaint: FLANK PAIN


Hx Obtained From: Patient


Hx Last Menstrual Period: 8/10/17


Onset/Duration: Started Days Ago, Still Present


Timing: Constant


Severity: Mild


Current Severity: Moderate


Pain Intensity: 5


Location of Pain: Flank


Pain Characteristics: Sharp


Associated Signs and Symptoms: Positive: Back Pain, Fever, Flank Pain, Other: - 

increased urinary frequency; Negative: burning with urination.  Negative: 

Dysuria


Aggravating Factor(s): Nothing


Alleviating Factor(s): Nothing





- Allergy/Home Medications


Allergies/Adverse Reactions: 


 Allergies











Allergy/AdvReac Type Severity Reaction Status Date / Time


 


azithromycin Allergy  Hives Verified 02/09/20 13:45


 


ciprofloxacin [From Cipro] Allergy  Hives Verified 02/09/20 13:45


 


levothyroxine Allergy  Hives Verified 02/09/20 13:45


 


Milk Containing Products Allergy  Unknown Verified 02/09/20 13:45





   Reaction  





   Details  


 


Penicillins Allergy  Hives Verified 02/09/20 13:45


 


soy Allergy  Rash Verified 02/09/20 13:45














PMH/Surg Hx/FS Hx/Imm Hx


Endocrine/Hematology History: Reports: Other Endocrine/Hematological Disorders 

- Hashimoto's Disease, chronic EBV


   Denies: Hx Diabetes


Cardiovascular History: 


   Denies: Hx Angina, Hx Hypercholesterolemia, Hx Hypertension


Respiratory History: Reports: Hx Sleep Apnea


 History: 


   Denies: Hx Dialysis


Sensory History: Reports: Hx Contacts or Glasses


   Denies: Hx Eye Prosthesis, Hx Legally Blind, Hx Deafness


Opthamlomology History: Reports: Hx Contacts or Glasses


   Denies: Hx Eye Prosthesis, Hx Legally Blind


Neurological History: Reports: Other Neuro Impairments/Disorders - Head injury





- Surgical History


Surgical History: None


Surgery Procedure, Year, and Place: none


Infectious Disease History: No


Infectious Disease History: 


   Denies: History Other Infectious Disease, Traveled Outside the [place] in Last 30 

Days





- Family History


Known Family History: Positive: Cardiac Disease, Hypertension, Diabetes





- Social History


Alcohol Use: Rare


Alcohol Amount: 1-2 times per year


Hx Substance Use: No


Substance Use Type: Reports: None


Hx Tobacco Use: Yes


Smoking Status (MU): Former Smoker





Review of Systems


Positive: Fever - intermittent


Positive: frequency, flank pain - bilateral.  Negative: burning, dysuria


All Other Systems Reviewed And Are Negative: Yes





Physical Exam





- Summary


Physical Exam Summary: 


VITAL SIGNS: Reviewed.


GENERAL: Patient is a well-developed and nourished female who is lying 

comfortable in the stretcher. Patient is not in any acute respiratory distress.


HEAD AND FACE: No signs of trauma. No ecchymosis, hematomas or skull 

depressions. No sinus tenderness.


EYES: PERRLA, EOMI x 2, No injected conjunctiva, no nystagmus.


EARS: Hearing grossly intact. Ear canals and tympanic membranes are within 

normal limits.


MOUTH: Oropharynx within normal limits.


NECK: Supple, trachea is midline, no adenopathy, no JVD, no carotid bruit, no c-

spine tenderness, neck with full ROM.


CHEST: Symmetric, no tenderness at palpation.


LUNGS: Clear to auscultation bilaterally. No wheezing or crackles.


CVS: Regular rate and rhythm, S1 and S2 present, no murmurs or gallops 

appreciated.


ABDOMEN: Soft, non-tender. No signs of distention. No rebound, no guarding, and 

no masses palpated. Bowel sounds are normal.


BACK: Right flank tenderness.


EXTREMITIES: FROM in all major joints, no edema, no cyanosis or clubbing.


NEURO: Alert and oriented x 3. No acute neurological deficits. Speech is normal 

and follows commands.


SKIN: Dry and warm.


Triage Information Reviewed: Yes


Vital Signs On Initial Exam: 


 Initial Vitals











Temp Pulse Resp BP Pulse Ox


 


 99.5 F   91   19   139/90   100 


 


 02/09/20 13:40  02/09/20 13:40  02/09/20 13:40  02/09/20 13:40  02/09/20 13:40











Vital Signs Reviewed: Yes





Procedures





- Sedation


Patient Received Moderate/Deep Sedation with Procedure: No





Diagnostics





- Vital Signs


 Vital Signs











  Temp Pulse Resp BP Pulse Ox


 


 02/09/20 13:40  99.5 F  91  19  139/90  100














- Laboratory


Lab Results: 


 Lab Results











  02/09/20 02/09/20 Range/Units





  14:10 14:10 


 


WBC  5.9   (3.5-10.8)  10^3/uL


 


RBC  4.51   (3.70-4.87)  10^6 /uL


 


Hgb  13.4   (12.0-16.0)  g/dL


 


Hct  40   (35-47)  %


 


MCV  89   (80-97)  fL


 


MCH  30   (27-31)  pg


 


MCHC  33   (31-36)  g/dL


 


RDW  14   (10-15)  %


 


Plt Count  249   (150-450)  10^3/uL


 


MPV  9.1   (7.4-10.4)  fL


 


Neut % (Auto)  59.2   %


 


Lymph % (Auto)  28.9   %


 


Mono % (Auto)  7.4   %


 


Eos % (Auto)  3.7   %


 


Baso % (Auto)  0.8   %


 


Absolute Neuts (auto)  3.5   (1.5-7.7)  10^3/ul


 


Absolute Lymphs (auto)  1.7   (1.0-4.8)  10^3/ul


 


Absolute Monos (auto)  0.4   (0-0.8)  10^3/ul


 


Absolute Eos (auto)  0.2   (0-0.6)  10^3/ul


 


Absolute Basos (auto)  0.0   (0-0.2)  10^3/ul


 


Absolute Nucleated RBC  0.0   10^3/ul


 


Nucleated RBC %  0.1   


 


Sodium   134 L  (135-145)  mmol/L


 


Potassium   4.7  (3.5-5.0)  mmol/L


 


Chloride   105  (101-111)  mmol/L


 


Carbon Dioxide   24  (22-32)  mmol/L


 


Anion Gap   5  (2-11)  mmol/L


 


BUN   9  (6-24)  mg/dL


 


Creatinine   0.72  (0.51-0.95)  mg/dL


 


Est GFR ( Amer)   105.1  (>60)  


 


Est GFR (Non-Af Amer)   86.8  (>60)  


 


BUN/Creatinine Ratio   12.5  (8-20)  


 


Glucose   87  ()  mg/dL


 


Calcium   9.3  (8.6-10.3)  mg/dL


 


Total Bilirubin   0.70  (0.2-1.0)  mg/dL


 


AST   12 L  (13-39)  U/L


 


ALT   10  (7-52)  U/L


 


Alkaline Phosphatase   38  ()  U/L


 


Total Protein   7.8  (6.4-8.9)  g/dL


 


Albumin   4.5  (3.2-5.2)  g/dL


 


Globulin   3.3  (2-4)  g/dL


 


Albumin/Globulin Ratio   1.4  (1-3)  











Result Diagrams: 


 02/09/20 14:10





 02/09/20 14:10


Lab Statement: Any lab studies that have been ordered have been reviewed, and 

results considered in the medical decision making process.





- CT


  ** Abdomen/Pelvis CT


CT Interpretation Completed By: Radiologist


Summary of CT Findings: Impression: No appreciable hydronephrosis or 

nephrolithiasis. No acute noncontrast CT pathology of the visualized abdomen or 

pelvis. ED physician has reviewed this report.





Re-Evaluation





- Re-Evaluation


  ** First Eval


Re-Evaluation Time: 16:35


Comment: We discussed results and plan for discharge. Patient agreeable with 

plan.





GIGU Course/Dx





- Course


Assessment/Plan: The patient is a 45-year-old female presenting to [facility] 

emergency department with a chief complaint of bilateral flank pain and 

increased urinary frequency worsening since 2/5/2020. She reports that she was 

experiencing low back discomfort, so she went to her PCPs office and was 

diagnosed with a urinary tract infection and placed on Bactrim. She was not 

suffering from any urinary symptoms at that time, and she has no history of 

UTIs in the past. She is now experiencing intermittent fevers and increased 

urinary frequency, and the back pain has begun to move up the back and flanks 

bilaterally. She denies any dysuria or burning with urination. Symptoms are 

currently rated 5/10 in severity. Tehre are no aggravating or alleviating 

factors. She is unsure of her last normal menstrual period. Past medical 

history significant for Hashimotos Disease, chronic Norma-Barr virus, 

previous head trauma. Former smoker, no alcohol use, rare substance use. 

Medications reviewed. Allergies noted. In the ED course, the patient was placed 

on a cardiac monitor, IV access was obtained, IV fluids started. Blood test w/o 

a significant abnormality. Urine cultures done on 2/6/2020 are negative for a 

UTI. Abdominopelvic CT impression: No acute pathology. It is likely her 

symptoms are secondary to a musculoskeletal pain. I discussed all the findings 

and test results with the patient. Patient was instructed to return to the 

emergency room immediately if any of the symptoms return or worsen. Plan of 

care was discussed with the patient, and she understands and agrees. All 

questions were answered at patient satisfaction. There were no further 

complaints or concerns. Lung exam before discharge: CTA B/L. Good air exchange. 

No wheezing or crackles heard. CVS: S1 and S2 present. No murmurs appreciated. 

Patient is alert and oriented x 3. Patient is hemodynamically stable. Patient 

will be discharged home with follow up PCP in the next 2-3 days.





- Diagnoses


Provider Diagnoses: 


 Back pain








Discharge ED





- Sign-Out/Discharge


Documenting (check all that apply): Patient Departure - Patient will be 

discharged home.





- Discharge Plan


Condition: Stable


Disposition: HOME


Patient Education Materials:  Back Pain (ED)


Referrals: 


Cherelle Shirley MD [Primary Care Provider] - 3 Days


Additional Instructions: 


Follow up with your primary care provider in 2-3 days. Return to the emergency 

department for any new or worsening symptoms.





- Billing Disposition and Condition


Condition: STABLE


Disposition: Home





- Attestation Statements


Document Initiated by Sagar: Yes


Documenting Scribe: Pamella Lock


Provider For Whom Sagar is Documenting (Include Credential): Dr. Tj Chandler MD


Scribe Attestation: 


Pamella CARROLL scribed for Dr. Tj Chandler MD on 02/09/20 at 2142. 


Scribe Documentation Reviewed: Yes


Provider Attestation: 


The documentation as recorded by the Pamella goodson accurately reflects 

the service I personally performed and the decisions made by me, Dr. Tj Chandler MD


Status of Scribe Document: Viewed

## 2020-02-09 NOTE — XMS REPORT
Continuity of Care Document (CCD)

 Created on:2020



Patient:Emely Taylor

Sex:Female

:1975

External Reference #:[id number]





Demographics







 Address  [address] [place] [address]

 

 Mobile Phone  [phone]

 

 Email Address  [email or web address]

 

 Preferred Language  en

 

 Marital Status  Declined to Specify/Unknown

 

 Zoroastrian Affiliation  Unknown

 

 Race  White

 

 Ethnic Group  Declined to Specify/Unknown









Author







 Name  Ryan Mckay MD (transmitted by agent of provider Ariane Garces)

 

 Address  [address] [facility], [address] [place] [id number]









Care Team Providers







 Name  Role  Phone

 

 Cherelle Pate MD - Family Medicine  Care Team Information   [phone]
[phone]









Problems







 Description

 

 No Information Available







Social History







 Type  Date  Description  Comments

 

 Birth Sex    Unknown  

 

 Tobacco Use  Start: Unknown End:  Former Cigarette Smoker  Smoked 1PPD for 16



   Unknown    years

 

 Smoking Status  Reviewed: 20  Former Cigarette Smoker  Smoked 1PPD for 16



       years

 

 ETOH Use    Denies alcohol use  

 

 Tobacco Use  Start: Unknown End:  Patient is a former  



   Unknown  smoker  

 

 Recreational Drug Use    Denies Drug Use  







Allergies, Adverse Reactions, Alerts







 Active Allergies  Reaction  Severity  Comments  Date

 

 Levothyroxine        2014

 

 Cipro        2015

 

 Penicillin G        2017

 

 Levothyroxine        2017

 

 Soy Proteins        2017

 

 Ciprofloxacin        2017

 

 Dairy Ease        2017

 

 [place] Nuts        2020

 

 Azithromycin        2020









 Inactive Allergies









 NKDA        2007

 

 Nuts        2008







Medications







 Active Medications  SIG  Qnty  Indications  Ordering  Date



         Provider  

 

 Fluticasone  1 puff each  29.7ml  J01.90  Ryan Mckay MD  2020



 Propionate Nasal  nostril bid        



 Spray          



      50mcg/Act          



 Suspension          



           

 

 Symbicort  1 twice a day  20.4gm  J45.998  Ryan Mckay MD  2020



           



 160-4.5mcg/Act          



 Aerosol          



           

 

 Ventolin HFA  2 every 4 hours as  18gm  J45.998  Ryan Mckay MD  2020



   needed        



 108(90Base) mcg/Act          



 Aerosol          



           

 

 Fast Acting B12  sublingual daily  90tabs    Abdi Flor,  2019



         M.D.  



 2500mcg Tablets Sub          



           

 

 Aspercreme  apply twice daily  6units  M06.4  Abdi Flor,  09/10/2019



 W/Lidocaine  to the painful      M.D.  



            4% Cream  joints as needed        



           

 

 Cpap Mask  Pls provide pt  1units    Nabila Odom,  2019



   with nasal      MD  



   cushions and chin        



   strap        

 

 Fluticasone  1 puff bid  16gm  J45.909  Ryan Mckay MD  2019



 Propionate          



           50mcg/Act          



 Suspension          



           

 

 Proair HFA  1 puff every 6  8.500gm  J45.909  Nabila Odom,  10/23/2017



   hours as needed      MD  



 108(90Base) mcg/Act          



 Aerosol          



           

 

 Symbicort  inhale one puff by      Unknown  



   mouth twice a day        



 80-4.5mcg/Act          



 Aerosol          



           

 

 Tirosint  Take 1 Capsule By      Unknown  



         75mcg  Mouth Every Day        



 Capsules          



           







Immunizations







 Description

 

 No Information Available







Vital Signs







 Date  Vital  Result  Comment

 

 2020  3:44pm  Height  66 inches  5'6"









 Weight  170.00 lb  Per pt

 

 Heart Rate  74 /min  

 

 BP Systolic Sitting  112 mmHg  Rue reg cuff

 

 BP Diastolic Sitting  80 mmHg  Rue reg cuff

 

 O2 % BldC Oximetry  96 %  On Ra

 

 BMI (Body Mass Index)  27.4 kg/m2  









 2020  2:23pm  Height  66 inches  5'6"









 Heart Rate  66 /min  

 

 BP Systolic  110 mmHg  

 

 BP Diastolic  74 mmHg  

 

 Respiratory Rate  12 /min  

 

 Body Temperature  97.8 F  

 

 Pain Level  0  







Results







 Test  Acquired Date  Facility  Test  Result  H/L  Range  Note

 

 Connective Tissue  2019  [facility]  Anti-Nuclear  0.7 U      
1



 Panel    [address]  Antibody        



     [place] [address] [phone]          









 Cyclic Citrullinated Peptide  <15.6 U      2

 

 Interpretation  See Comment      3









 Laboratory test  2019  [facility]  Rheumatoid  < 10 IU/mL  
Normal  <15  



 finding    [address]  Factor        



     [place] [address] [phone]          









 Erythrocyte Sed Rate  8 mm/Hr  Normal  0-19  

 

 C Reactive Protein  < 1.00 mg/L  Normal  <8.01  









 Vitamin B12  2019  [facility]  Vitamin B12  367 pg/mL  Normal
  180-914  4



 And Folate    [address]          



 [place] [address] [phone]          









 Folic Acid (Folate)  7.53 ng/mL    >3.99  









 Laboratory test  2019  [facility]  Vitamin D  15.3 ng/mL  Low
  20-50  5



 finding    [address]  [address] 25([address])        



     [place] [address]          



     [phone]          









 1  -------------------REFERENCE VALUE--------------------------



   <=1.0 (Negative)

 

 2  -------------------REFERENCE VALUE--------------------------



   <20.0 (Negative)

 

 3  Tests for antibodies to dsDNA and RENETTA antigens are not



   performed automatically unless the DWAYNE result is > or =



   3.0 U.  Studies performed at [facility] indicate that



   positive DWAYNE results <3.0 U are rarely accompanied by



   positive second order tests.



   Test Performed by:



   [facility]



   [address] [place] [address]



   : Jorge A Mccollum M.D. Ph.D.; CLIA# [id number]

 

 4  Normal Range 180 to 914



   Indeterminate Range 145 to 180



   Deficient Range  <145

 

 5  Total 25-Hydroxyvitamin D2 and D3 (25-OH-VitD)



   



   <10 ng/mL (severe deficiency)



   



   10-19 ng/mL (mild to moderate deficiency)



   



   20-50 ng/mL (optimum levels)



   



   51-80 ng/mL (increased risk of hypercalciuria)



   



   >80 ng/mL (toxicity possible)







Procedures







 Description

 

 No Information Available







Medical Devices







 Description

 

 No Information Available







Encounters







 Type  Date  Location  Provider  Dx  Diagnosis

 

 Office Visit  2020  [facility]  Mariano RUGGIERO  S83.011A  Lateral 
subluxation



   1:30p  at Kelly Villarreal MD    of right patella,



           initial encounter

 

 Office Visit  09/10/2019  Rheumatology  Abdi Flor,  M06.4  Inflammatory



   3:20p  [facility]    polyarthropathy









 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

 

 E53.8  Deficiency of other specified B group vitamins

 

 E55.9  Vitamin D deficiency, unspecified







Assessments







 Date  Code  Description  Provider

 

 2020  J01.90  Acute sinusitis, unspecified  Ryan Mckay MD

 

 2020  J45.998  Other asthma  Ryan Mckay MD

 

 2020  S83.011A  Lateral subluxation of right patella,  Mariano Villarreal MD



     initial encounter  

 

 09/10/2019  M06.4  Inflammatory polyarthropathy  Abdi Flor M.D.

 

 09/10/2019  H04.123  Dry eye syndrome of bilateral lacrimal  Abdi Flor M.D.



     glands  

 

 09/10/2019  E53.8  Deficiency of other specified B group  Abdi Flor M.D.



     vitamins  

 

 09/10/2019  E55.9  Vitamin D deficiency, unspecified  Abdi Flor M.D.







Plan of Treatment

Future Appointment(s):2020  1:45 pm - Mariano Villarreal MD at [facility] at [id number] - Ryan Mckay MDJ01.90 Acute sinusitis, 
unspecifiedNew Medication:Fluticasone Propionate Nasal Spray 50 mcg/Act - 1 
puff each nostril bidComments:Continue flonase.Referral:[facility], 
Speech-Language PathFollow up:3 months.J45.998 Other asthmaNew Medication:
Symbicort 160-4.5 mcg/Act - 1 twice a dayVentolin (90 Base) mcg/Act - 2 
every 4 hours as neededComments:Start symbicort and ventolin rescue inhaler. 
She should see speech therapy to make sure there is no aspiration.



Functional Status







 Description

 

 No Information Available







Mental Status







 Description

 

 No Information Available







Referrals







 Refer to   Reason for Referral  Status  Appt Date

 

 [facility]    Sent  









 [address]

 

 [place] [address] [phone]

## 2020-02-09 NOTE — XMS REPORT
Continuity of Care Document (CCD)

 Created on:2020



Patient:Emely Taylor

Sex:Female

:1975

External Reference #:[id number]





Demographics







 Address  [address] [place] [address]

 

 Mobile Phone  [phone]

 

 Email Address  [email or web address]

 

 Preferred Language  en

 

 Marital Status  Declined to Specify/Unknown

 

 Scientologist Affiliation  Unknown

 

 Race  White

 

 Ethnic Group  Declined to Specify/Unknown









Author







 Name  Mariano Villarreal MD (transmitted by agent of provider Luh Valerio
)

 

 Address  [address] [place] [id number]









Care Team Providers







 Name  Role  Phone

 

 Cherelle Pate MD - Family Medicine  Care Team Information   [phone]
[phone]









Problems







 Description

 

 No Information Available







Social History







 Type  Date  Description  Comments

 

 Birth Sex    Unknown  

 

 Tobacco Use  Start: Unknown End:  Former Cigarette Smoker  Smoked 1PPD for 16



   Unknown    years

 

 Smoking Status  Reviewed: 20  Former Cigarette Smoker  Smoked 1PPD for 16



       years

 

 ETOH Use    Denies alcohol use  

 

 Tobacco Use  Start: Unknown End:  Patient is a former  



   Unknown  smoker  

 

 Recreational Drug Use    Denies Drug Use  







Allergies, Adverse Reactions, Alerts







 Active Allergies  Reaction  Severity  Comments  Date

 

 Nuts        2008

 

 Levothyroxine        2014

 

 Cipro        2015

 

 Penicillin G        2017

 

 Levothyroxine        2017

 

 Soy Proteins        2017

 

 Ciprofloxacin        2017

 

 Dairy Ease        2017









 Inactive Allergies









 NKDA        2007







Medications







 Active Medications  SIG  Qnty  Indications  Ordering  Date



         Provider  

 

 Fast Acting B12  sublingual daily  90tabs    Abdi Flor,  2019



         M.D.  



 2500mcg Tablets Sub          



           

 

 Aspercreme  apply twice daily to  6units  M06.4  Abdi Flor,  09/10/2019



 W/Lidocaine  the painful joints      M.D.  



             4%  as needed        



 Cream          



           

 

 Cpap Mask  Pls provide pt with  1units    Nabila Odom,  2019



   nasal cushions and      MD  



   chin strap        

 

 Fluticasone  1 puff bid  16gm  J45.909  Ryan Mckay,  2019



 Propionate        MD  



           



 50mcg/Act          



 Suspension          



           

 

 Neoprene Patella  use daily as needed  2units  M22.41  Abdi Flor,  2018



 Knee Support/Medium  for right knee      M.D.  



   stabilization        



  Misc          



           

 

 Proair HFA  1 puff every 6 hours  8.500gm  J45.909  Nabila Ilene,  10/23/2017



   as needed      MD  



 108(90Base) mcg/Act          



 Aerosol          



           

 

 Wrist Brace  use for the left  2units    Abdi Flor,  2017



              Misc  wrist and right      M.D.  



   wrist nightly to        



   help carpal tunnel        



   syndrome        

 

 Neoprene Patella  use daily as needed  1units  M22.42  Abdi Wrightdor,  2017



 Knee Support/Medium  for left knee      M.D.  



   stabilization        



  Misc          



           

 

 Symbicort  inhale one puff by      Unknown  



   mouth twice a day        



 80-4.5mcg/Act          



 Aerosol          



           

 

 Tirosint  Take 1 Capsule By      Unknown  



          75mcg  Mouth Every Day        



 Capsules          



           







Immunizations







 Description

 

 No Information Available







Vital Signs







 Date  Vital  Result  Comment

 

 2020  2:23pm  Height  66 inches  5'6"









 Heart Rate  66 /min  

 

 BP Systolic  110 mmHg  

 

 BP Diastolic  74 mmHg  

 

 Respiratory Rate  12 /min  

 

 Body Temperature  97.8 F  

 

 Pain Level  0  









 09/10/2019  3:33pm  Height  66 inches  5'6"









 Heart Rate  79 /min  

 

 BP Systolic  116 mmHg  

 

 BP Diastolic  78 mmHg  

 

 Pain Level  6  

 

 O2 % BldC Oximetry  98 %  







Results







 Test  Acquired Date  Facility  Test  Result  H/L  Range  Note

 

 Connective Tissue  2019  [facility]  Anti-Nuclear  0.7 U      
1



 Panel    [address]  Antibody        



     [place] [address] [phone]          









 Cyclic Citrullinated Peptide  <15.6 U      2

 

 Interpretation  See Comment      3









 Laboratory test  2019  [facility]  Rheumatoid  < 10 IU/mL  
Normal  <15  



 finding    [address]  Factor        



     [place] [address] [phone]          









 Erythrocyte Sed Rate  8 mm/Hr  Normal  0-19  

 

 C Reactive Protein  < 1.00 mg/L  Normal  <8.01  









 Vitamin B12  2019  [facility]  Vitamin B12  367 pg/mL  Normal
  180-914  4



 And Folate    [address]          



 [place] [address] [phone]          









 Folic Acid (Folate)  7.53 ng/mL    >3.99  









 Laboratory test  2019  [facility]  Vitamin D  15.3 ng/mL  Low
  20-50  5



 finding    [address]  [address] 25(Oh)        



     [place] [address]          



     [phone]          









 1  -------------------REFERENCE VALUE--------------------------



   <=1.0 (Negative)

 

 2  -------------------REFERENCE VALUE--------------------------



   <20.0 (Negative)

 

 3  Tests for antibodies to dsDNA and RENETTA antigens are not



   performed automatically unless the DWAYNE result is > or =



   3.0 U.  Studies performed at [facility] indicate that



   positive DWAYNE results <3.0 U are rarely accompanied by



   positive second order tests.



   Test Performed by:



   [facility]



   [address] [place] [address]



   : Jorge A Mccollum M.D. Ph.D.; CLIA# [id number]

 

 4  Normal Range 180 to 914



   Indeterminate Range 145 to 180



   Deficient Range  <145

 

 5  Total 25-Hydroxyvitamin D2 and D3 (25-OH-VitD)



   



   <10 ng/mL (severe deficiency)



   



   10-19 ng/mL (mild to moderate deficiency)



   



   20-50 ng/mL (optimum levels)



   



   51-80 ng/mL (increased risk of hypercalciuria)



   



   >80 ng/mL (toxicity possible)







Procedures







 Description

 

 No Information Available







Medical Devices







 Description

 

 No Information Available







Encounters







 Type  Date  Location  Provider  Dx  Diagnosis

 

 Office Visit  09/10/2019  Rheumatology  Abdi Flor,  M06.4  Inflammatory



   3:20p  [facility]    polyarthropathy









 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

 

 E53.8  Deficiency of other specified B group vitamins

 

 E55.9  Vitamin D deficiency, unspecified







Assessments







 Date  Code  Description  Provider

 

 2020  S83.011A  Lateral subluxation of right patella,  Mariano Villarreal MD



     initial encounter  

 

 09/10/2019  M06.4  Inflammatory polyarthropathy  Abdi Flor M.D.

 

 09/10/2019  H04.123  Dry eye syndrome of bilateral lacrimal  Abdi Flor M.D.



     glands  

 

 09/10/2019  E53.8  Deficiency of other specified B group  Abdi Flor M.D.



     vitamins  

 

 09/10/2019  E55.9  Vitamin D deficiency, unspecified  Abdi Flor M.D.







Plan of Treatment

2020 - Mariano Villarreal, MDS83.011A Lateral subluxation of right patella
, initial encounterNew Therapy:Physical TherapyFollow up:Follow up:   6 weeks



Functional Status







 Description

 

 No Information Available







Mental Status







 Description

 

 No Information Available







Referrals







 Description

 

 No Information Available